# Patient Record
Sex: MALE
[De-identification: names, ages, dates, MRNs, and addresses within clinical notes are randomized per-mention and may not be internally consistent; named-entity substitution may affect disease eponyms.]

---

## 2020-03-10 ENCOUNTER — HOSPITAL ENCOUNTER (OUTPATIENT)
Dept: HOSPITAL 92 - BICCT | Age: 85
Discharge: HOME | End: 2020-03-10
Attending: FAMILY MEDICINE
Payer: MEDICARE

## 2020-03-10 DIAGNOSIS — K80.20: ICD-10-CM

## 2020-03-10 DIAGNOSIS — J92.0: ICD-10-CM

## 2020-03-10 DIAGNOSIS — I25.5: ICD-10-CM

## 2020-03-10 DIAGNOSIS — R09.89: ICD-10-CM

## 2020-03-10 DIAGNOSIS — I25.10: Primary | ICD-10-CM

## 2020-03-10 DIAGNOSIS — R91.8: ICD-10-CM

## 2020-03-10 PROCEDURE — 71250 CT THORAX DX C-: CPT

## 2020-03-10 NOTE — CT
CT chest noncontrast



HISTORY: Asthma stenosis. Dyspnea.



FINDINGS: Scattered mild areas of dystrophic calcifications associated with the pleura of each hemith
orax. Mild peripheral interstitial thickening. No lobar consolidation or pleural fluid.



Centered within the lateral aspect of the right upper lobe is an oval soft tissue density mass with s
ubtle spiculated periphery. It measures up to 1.3 cm x 1.0 cm greatest diameters on the axial

images. Within the superior segment right lower lobe, an oval 0.8 cm noncalcified nodule is present.



Lack of contrast limits evaluation of the soft tissues. There is calcification within the coronary ar
teries and other arterial structures. Nonenlarged, 1.6 cm precarinal lymph node is apparent.

Inferior most images show calcified granulomata of the spleen. Small cyst in the left liver lobe. Hyp
erdense stones in the dependent portion of the gallbladder lumen.











IMPRESSION: Right lung nodules, measuring up to 1.3 cm greatest diameter. Metastatic disease is suspe
cted. Lesions could be evaluated with radionucleotide PET scan hypermetabolic activity.



Asbestos related pleural disease.



Atherosclerosis.



Cholelithiasis.



Reported By: HOWARD Clements 

Electronically Signed:  3/10/2020 1:59 PM

## 2020-07-15 ENCOUNTER — HOSPITAL ENCOUNTER (OUTPATIENT)
Dept: HOSPITAL 92 - BICCT | Age: 85
Discharge: HOME | End: 2020-07-15
Attending: INTERNAL MEDICINE
Payer: MEDICARE

## 2020-07-15 DIAGNOSIS — R91.1: Primary | ICD-10-CM

## 2020-07-15 DIAGNOSIS — J61: ICD-10-CM

## 2020-07-15 PROCEDURE — 71250 CT THORAX DX C-: CPT

## 2020-07-15 NOTE — CT
CT THORAX NONCONTRAST:



DATE:

7/15/2020



HISTORY:

88-year-old male follow-up pulmonary nodules.



COMPARISON:

3/10/2020



FINDINGS:

The 0.7 x 0.2 x 0.5 cm noncalcified pulmonary nodule in the superior segment of the right lower lobe,
 is demonstrated on sagittal reconstructions to have a craniocaudally flattened configuration, and

is apparently along an accessory fissure (sagittal image 79 of 105, series 6; axial image 67 of 167, 
series 3), consistent with a benign fissural pulmonary lymph node.



In the anterior segment of the right upper lobe, the approximately 1 x 1 x 1.3 cm noncalcified pulmon
sunny nodule with irregular margins, is unchanged.



No new pulmonary nodules.

Scattered chronic mild-moderate subpleural reticular densities throughout the upper, mid, and lower l
tino zones.

Multiple calcified pleural plaques bilaterally due to prior asbestos exposure. No consolidation, pulm
onary edema, or pneumothorax.

Trachea and bilateral major bronchi are diffusely mildly ectatic.

Permanent transvenous pacemakers with bilateral pulse generators.

Mildly elevated right hemidiaphragm.

Moderately low attenuation 1.5 x 1 cm hepatic lesion in left lobe of liver, unchanged.

4 cm round exophytic cystic lesion protruding from upper pole of right kidney. This was not included 
on previous CT.

Otherwise no interval change overall.

Coronary atherosclerotic disease due to calcified coronary lesion.

No thoracic aortic aneurysm.

Nonspecific mildly enlarged mediastinal lymph nodes.



IMPRESSION:

1) the lobular 13 mm right upper lobe pulmonary nodule is unchanged since 3/10/2020. Primary lung can
cer is a possibility. Recommend PET scan.

2) the 7 mm pulmonary nodule in the superior segment of the right lower lobe is a benign intrapulmona
ry lymph node.

3) asbestos related pleural disease



Reported By: Adam Altamirano 

Electronically Signed:  7/15/2020 10:31 AM

## 2021-01-08 ENCOUNTER — HOSPITAL ENCOUNTER (EMERGENCY)
Dept: HOSPITAL 92 - ERS | Age: 86
Discharge: HOME | End: 2021-01-08
Payer: MEDICARE

## 2021-01-08 DIAGNOSIS — S51.012A: Primary | ICD-10-CM
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
## 2021-01-08 DIAGNOSIS — E78.5: ICD-10-CM

## 2021-01-08 DIAGNOSIS — I10: ICD-10-CM

## 2021-01-08 DIAGNOSIS — S00.03XA: ICD-10-CM

## 2021-01-08 PROCEDURE — 90471 IMMUNIZATION ADMIN: CPT

## 2021-01-08 PROCEDURE — 90715 TDAP VACCINE 7 YRS/> IM: CPT

## 2021-01-08 PROCEDURE — 70450 CT HEAD/BRAIN W/O DYE: CPT

## 2021-01-19 ENCOUNTER — HOSPITAL ENCOUNTER (OUTPATIENT)
Dept: HOSPITAL 92 - BICCT | Age: 86
Discharge: HOME | End: 2021-01-19
Attending: INTERNAL MEDICINE
Payer: MEDICARE

## 2021-01-19 DIAGNOSIS — R91.8: Primary | ICD-10-CM

## 2021-01-19 PROCEDURE — 71250 CT THORAX DX C-: CPT

## 2021-01-19 NOTE — CT
Chest CT without contrast:

1/19/2021



COMPARISON: 7/15/2020 and 3/10/2020



HISTORY: Reevaluate pulmonary nodule



TECHNIQUE: Axial CT imaging at 3 mm intervals from the thoracic inlet through the upper abdomen witho
ut contrast. Coronal and sagittal reformatted imaging obtained.



FINDINGS: Evaluation of the vascular structures, imaged viscera, and for lymphadenopathy is limited o
n noncontrast enhanced imaging.



Stable partially imaged cyst emanating from upper pole of the right kidney noted measuring at least 3
.8 cm. The imaged upper abdominal aorta demonstrate scattered atherosclerotic calcification.

Granulomatous calcifications of the visualized spleen noted. There is a stable small low-density lesi
on within the anterior aspect of the left lobe of the liver measuring 1.6 cm, likely representing a

small cyst.



There is no pleural, pericardial, or mediastinal fluid present.



Transvenous pacing leads are noted bilaterally.



Scattered partially calcified posterior pleural plaques noted within the bilateral lower lobes, left 
greater than right, consistent with asbestos-related pleural disease. There are a few additional

calcified pleural plaques anteriorly on the right.



Increased linear interstitial density with subpleural cystic change noted anteriorly and medially wit
hin the mid left upper lobe, stable.



There is a mass within the right upper lobe on axial image 59 of soft tissue density which measures 1
.3 cm in transverse dimension, 1 cm in AP dimension, and 9 mm in craniocaudal dimension,

demonstrating no significant change when compared to the prior study performed 3/10/2020.



Peripheral linear increased interstitial density noted within the right middle lobe and the inferior 
posterior right lower lobe. There is a nodule within the right lower lobe posteriorly on axial

image 69 measuring approximately 8 mm in AP dimension, unchanged as well when compared to the 3/10/20
20 exam. No new pulmonary nodule appreciated on either side.



Review of the osseous structures demonstrates stable scattered areas of degenerative change within th
e imaged spine. No worrisome lytic or blastic bone lesions.



IMPRESSION: Stable right-sided pulmonary nodules measuring up to 1.4 cm within the right upper lobe. 
Continued follow-up is suggested as primary lung cancer cannot be excluded. A PET/CT could

definitively evaluate this lesion given size.



Reported By: Pj Welch 

Electronically Signed:  1/19/2021 11:32 AM

## 2023-08-02 NOTE — CT
CT BRAIN WITHOUT CONTRAST:

 

Date:  01/08/2021

 

HISTORY:  

Fall. Headache. 

 

FINDINGS:

 

There are no previous exams for comparison. 

 

There are changes of cortical atrophy and chronic small vessel ischemic disease. The ventricular size
 is appropriate and the basilar cisterns are patent. No evidence of acute infarct, hemorrhage, midlin
e shift, or abnormal extra-axial fluid collections are seen. The bony calvarium is intact. The visual
ized paranasal sinuses and mastoid air cells are well aerated. There is a soft tissue scalp contusion
 in the left frontoparietal region. 

 

IMPRESSION: 

No CT evidence of acute intracranial process.  

 

POS: [facility]